# Patient Record
Sex: MALE | Race: WHITE | NOT HISPANIC OR LATINO | Employment: FULL TIME | RURAL
[De-identification: names, ages, dates, MRNs, and addresses within clinical notes are randomized per-mention and may not be internally consistent; named-entity substitution may affect disease eponyms.]

---

## 2023-02-09 ENCOUNTER — OFFICE VISIT (OUTPATIENT)
Dept: CARDIOLOGY | Facility: CLINIC | Age: 58
End: 2023-02-09
Payer: COMMERCIAL

## 2023-02-09 VITALS
WEIGHT: 203 LBS | DIASTOLIC BLOOD PRESSURE: 75 MMHG | HEART RATE: 87 BPM | SYSTOLIC BLOOD PRESSURE: 117 MMHG | HEIGHT: 73 IN | BODY MASS INDEX: 26.9 KG/M2

## 2023-02-09 DIAGNOSIS — R00.2 PALPITATIONS: Primary | ICD-10-CM

## 2023-02-09 DIAGNOSIS — I10 HYPERTENSION, ESSENTIAL: ICD-10-CM

## 2023-02-09 PROCEDURE — 99203 OFFICE O/P NEW LOW 30 MIN: CPT | Performed by: SPECIALIST

## 2023-02-09 RX ORDER — LOSARTAN POTASSIUM 50 MG/1
25 TABLET ORAL DAILY
COMMUNITY
Start: 2022-12-27

## 2023-02-09 NOTE — PROGRESS NOTES
[facility]   Cardiology Consult Note    Patient Name: Benjamin Sepulveda  : 1965  Referring Physician: No ref. provider found  Subjective   Subjective     Reason for Consult/ Chief Complaint:   Chief Complaint   Patient presents with   • Rapid Heart Rate   • Palpitations   • Hypertension       HPI:  Benjamin Sepulveda is a 57 y.o. male with history of hypertension has had palpitations on and off for the last few months.  Palpitations lasted a few minutes, relieved spontaneously.  No aggravating relieving factors.  No syncopal or presyncopal spell.  No chest pain.  He smokes marijuana.    Review of Systems:    Constitutional no fever,  no weight loss   Skin no rash   Otolaryngeal no difficulty swallowing   Cardiovascular See HPI   Pulmonary no cough, no sputum production   Gastrointestinal no constipation, no diarrhea   Genitourinary no dysuria, no hematuria   Hematologic no easy bruisability, no abnormal bleeding   Musculoskeletal no muscle pain   Neurologic no dizziness, no falls       Personal History     Past Medical History:  Past Medical History:   Diagnosis Date   • Hypertension        Family History:   Family History   Problem Relation Age of Onset   • Hypertension Father        Social History:  reports that he has quit smoking. His smoking use included cigarettes. His smokeless tobacco use includes snuff. He reports current alcohol use. He reports current drug use. Drug: Marijuana.    Home Medications:  losartan    Allergies:  No Known Allergies    Objective    Objective     Vitals:   Heart Rate:  [87] 87  BP: (117)/(75) 117/75  Body mass index is 26.78 kg/m².  PHYSICAL EXAM:    General Appearance:   · well developed  · well nourished  HENT:   · oropharynx moist  · lips not cyanotic  Neck:  · thyroid not enlarged  · supple  Respiratory:  · no respiratory distress  · normal breath sounds  · no rales  Cardiovascular:  · no jugular venous distention  · regular rhythm  · apical impulse normal  · S1 normal, S2  normal  · no S3, no S4   · no murmur  · no rub, no thrill  · carotid pulses normal; no bruit  · pedal pulses normal  · lower extremity edema: none    Skin:   · warm, dry  Psychiatric:  · judgement and insight appropriate  · normal mood and affect    RESULTS:    EKG reviewed by me and shows sinus rhythm       Result Review    Result Review:  I have personally reviewed the available results:  [x]  Laboratory  [x]  EKG/Telemetry   [x]  Cardiology/Vascular   [x] Medications  [x]  Old records      Procedures     Impression/Plan  1.  Palpitations: Cessation of marijuana use advised.  We will do an echocardiogram to evaluate any valvular heart disease.  48-hour Holter monitoring for any significant arrhythmias.  Low Caffeine diet advised  2.  Essential hypertension controlled: Continue Cozaar 25 mg once a day.        Electronically signed by Dany Mcelroy MD, 02/09/23, 11:24 AM EST.

## 2023-03-08 ENCOUNTER — TELEPHONE (OUTPATIENT)
Dept: CARDIOLOGY | Facility: CLINIC | Age: 58
End: 2023-03-08
Payer: COMMERCIAL

## 2023-03-08 RX ORDER — METOPROLOL SUCCINATE 25 MG/1
25 TABLET, EXTENDED RELEASE ORAL DAILY
Qty: 90 TABLET | Refills: 3 | Status: SHIPPED | OUTPATIENT
Start: 2023-03-08

## 2023-03-08 NOTE — TELEPHONE ENCOUNTER
----- Message from MILTON Hogan sent at 3/7/2023  6:09 PM EST -----  Notify pt holter result: Maximum heart rate is 166.  Minimum heart rate 56.  Average heart rate of 93.  There are occasional PACs (less than 1% of all beats).  Frequent PVCs (3% of all beats) can be felt as palpitations, limit caffeine intake.  Short run of SVT. Start toprol XL 25 mg daily. Follow up as scheduled  1 patient-triggered event: Normal Sinus Rhythm with PVC

## 2023-03-21 ENCOUNTER — TELEPHONE (OUTPATIENT)
Dept: CARDIOLOGY | Facility: CLINIC | Age: 58
End: 2023-03-21
Payer: COMMERCIAL

## 2023-03-21 NOTE — TELEPHONE ENCOUNTER
----- Message from Nayla Argueta sent at 3/20/2023  3:09 PM EDT -----    ----- Message -----  From: Leigha Fuchs APRN  Sent: 3/20/2023   2:16 PM EDT  To: Nayla Argueta    Notify pt echo result: Normal left ventricular systolic function. EF 58%  No significant valve abnormalities noted. Follow up as scheduled

## 2023-06-08 NOTE — PROGRESS NOTES
[facility]  Cardiology progress Note    Patient Name: Benjamin Sepulveda  : 1965    CHIEF COMPLAINT  Palpitations        Subjective   Subjective     HISTORY OF PRESENT ILLNESS    Benjamin Sepulveda is a 58 y.o. male with history of palpitations and hypertension.  No further palpitations.    REVIEW OF SYSTEMS    Constitutional:    No fever, no weight loss  Skin:     No rash  Otolaryngeal:    No difficulty swallowing  Cardiovascular: See HPI.  Pulmonary:    No cough, no sputum production    Personal History     Social History:    reports that he has quit smoking. His smoking use included cigarettes. His smokeless tobacco use includes snuff. He reports current alcohol use. He reports current drug use. Drug: Marijuana.    Home Medications:  Current Outpatient Medications on File Prior to Visit   Medication Sig    [DISCONTINUED] losartan (COZAAR) 50 MG tablet Take 25 mg by mouth Daily.    [DISCONTINUED] metoprolol succinate XL (TOPROL-XL) 25 MG 24 hr tablet Take 1 tablet by mouth Daily.     No current facility-administered medications on file prior to visit.       Past Medical History:   Diagnosis Date    Hypertension        Allergies:  No Known Allergies    Objective    Objective       Vitals:   Heart Rate:  [80] 80  BP: (126)/(76) 126/76  Body mass index is 26.12 kg/m².     PHYSICAL EXAM:    General Appearance:   well developed  well nourished  HENT:   oropharynx moist  lips not cyanotic  Neck:  thyroid not enlarged  supple  Respiratory:  no respiratory distress  normal breath sounds  no rales  Cardiovascular:  no jugular venous distention  regular rhythm  apical impulse normal  S1 normal, S2 normal  no S3, no S4   no murmur  no rub, no thrill  carotid pulses normal; no bruit  pedal pulses normal  lower extremity edema: none    Skin:   warm, dry  Psychiatric:  judgement and insight appropriate  normal mood and affect        Result Review:  I have personally reviewed the available results from  [x]   Laboratory  [x]  EKG  [x]  Cardiology  [x]  Medications  [x]  Old records  []  Other:     Procedures    Results for orders placed in visit on 03/20/23    Adult Transthoracic Echo Complete W/ Cont if Necessary Per Protocol    Interpretation Summary  Normal left ventricular systolic function.  No significant valve abnormalities noted.     Impression/Plan:  1.  Essential hypertension controlled: Continue Cozaar 25 mg once a day.  2.  Stable palpitations: Normal left ventricular systolic function.  Holter within normal limits.  Toprol-XL 25 mg once a day.        Dany Mcelroy MD   06/12/23   11:07 EDT

## 2023-06-12 ENCOUNTER — OFFICE VISIT (OUTPATIENT)
Dept: CARDIOLOGY | Facility: CLINIC | Age: 58
End: 2023-06-12
Payer: COMMERCIAL

## 2023-06-12 VITALS
WEIGHT: 198 LBS | HEIGHT: 73 IN | HEART RATE: 80 BPM | SYSTOLIC BLOOD PRESSURE: 126 MMHG | BODY MASS INDEX: 26.24 KG/M2 | DIASTOLIC BLOOD PRESSURE: 76 MMHG

## 2023-06-12 DIAGNOSIS — R00.2 PALPITATIONS: ICD-10-CM

## 2023-06-12 DIAGNOSIS — I10 HYPERTENSION, ESSENTIAL: Primary | ICD-10-CM

## 2023-06-12 PROCEDURE — 99213 OFFICE O/P EST LOW 20 MIN: CPT | Performed by: SPECIALIST

## 2023-06-12 RX ORDER — METOPROLOL SUCCINATE 25 MG/1
25 TABLET, EXTENDED RELEASE ORAL DAILY
Qty: 90 TABLET | Refills: 3 | Status: SHIPPED | OUTPATIENT
Start: 2023-06-12

## 2023-06-12 RX ORDER — LOSARTAN POTASSIUM 50 MG/1
25 TABLET ORAL DAILY
Qty: 90 TABLET | Refills: 5 | Status: SHIPPED | OUTPATIENT
Start: 2023-06-12